# Patient Record
Sex: MALE | Race: BLACK OR AFRICAN AMERICAN | NOT HISPANIC OR LATINO | Employment: FULL TIME | URBAN - METROPOLITAN AREA
[De-identification: names, ages, dates, MRNs, and addresses within clinical notes are randomized per-mention and may not be internally consistent; named-entity substitution may affect disease eponyms.]

---

## 2018-09-22 ENCOUNTER — HOSPITAL ENCOUNTER (EMERGENCY)
Facility: CLINIC | Age: 33
Discharge: OTHER INSTITUTION | End: 2018-09-22
Attending: EMERGENCY MEDICINE | Admitting: EMERGENCY MEDICINE

## 2018-09-22 VITALS
TEMPERATURE: 98.2 F | DIASTOLIC BLOOD PRESSURE: 81 MMHG | WEIGHT: 160 LBS | RESPIRATION RATE: 16 BRPM | BODY MASS INDEX: 22.9 KG/M2 | OXYGEN SATURATION: 95 % | HEIGHT: 70 IN | SYSTOLIC BLOOD PRESSURE: 143 MMHG

## 2018-09-22 DIAGNOSIS — F10.920 ALCOHOLIC INTOXICATION WITHOUT COMPLICATION (H): ICD-10-CM

## 2018-09-22 PROCEDURE — 82075 ASSAY OF BREATH ETHANOL: CPT

## 2018-09-22 PROCEDURE — 99285 EMERGENCY DEPT VISIT HI MDM: CPT

## 2018-09-22 ASSESSMENT — ENCOUNTER SYMPTOMS
BACK PAIN: 0
TREMORS: 0
ABDOMINAL PAIN: 0

## 2018-09-22 NOTE — ED NOTES
Bed: ED17  Expected date:   Expected time:   Means of arrival:   Comments:  HEMS 411  32M  ETOH/dyspnea

## 2018-09-22 NOTE — ED PROVIDER NOTES
"  History     Chief Complaint:  Alcohol Intoxication    The history is provided by the patient. The history is limited by the condition of the patient.      Sidney Elliott is a 32 year old male with a history of alcohol abuse who presents to the emergency department via EMS for evaluation of alcohol intoxication. Earlier this [place], [place] \"[place]ouncing around places to stay.\" The patient was transferred to the emergency department via EMS as he was noticeably intoxicated and unable to care for himself. Here, the patient notes that he drinks \"a lot\" daily, but did not specify how much. He does endorse low back pain, but says that this is chronic and denies any other pain prompting his visit to the emergency department. The patient denies any suicidal ideation or history of alcohol withdrawal seizures but has had alcohol withdrawal symptoms in the past. Additionally, he notes the frequent use of marijuana.     Allergies:  NKDA    Medications:    Motrin    Past Medical History:    Asthma  Alcohol Abuse    Past Surgical History:    Orthopedic Surgery    Family History:    No past pertinent family history.    Social History:  Marital Status: Unknown  Tobacco Use: Yes, Daily  Alcohol Use: Yes, \"a lot\"    Review of Systems   Reason unable to perform ROS: Alcohol Intoxication.   Gastrointestinal: Negative for abdominal pain.   Musculoskeletal: Negative for back pain (Chronic).   Neurological: Negative for tremors.   Psychiatric/Behavioral: Negative for self-injury and suicidal ideas.       Physical Exam     Patient Vitals for the past 24 hrs:   BP Temp Temp src Heart Rate Resp SpO2 Height Weight   09/22/18 0422 126/89 98.2  F (36.8  C) Oral 65 16 99 % 1.778 m (5' 10\") 72.6 kg (160 lb)       Physical Exam  General: Lying on bed, smells of marijuana  Eyes:  The pupils are equal and round    Conjunctivae and sclerae are normal  ENT:    No head " trauma  Neck:  Normal range of motion  CV:  Regular rate and rhythm    Skin warm and well perfused   Resp:  Lungs are clear    Non-labored    No rales    No wheezing   GI:  Abdomen is soft, there is no rigidity    No distension    No rebound tenderness     No abdominal tenderness  MS:  Normal muscular tone  Skin:  No rash or acute skin lesions noted  Neuro:   Awake, alert.      Speech is slightly slurred    Face is symmetric.     Moves all extremities equally  Psych:  Normal affect.  Appropriate interactions.    Emergency Department Course     Emergency Department Course:  0427 Nursing notes and vitals reviewed. I performed an exam of the patient as documented above.     0638 I rechecked the patient, he still has unsteady gait.     The patient will be signed out to the oncoming physician, Dr. Pryor. The plan will be for detox when he is steady on his feet vs discharge once clinically sober.    Impression & Plan      Medical Decision Making:  Sidney Elliott is a 32 year old male who presents for evaluation of alcohol intoxication.  They are intoxicated here in ED by clinical assessment.   Patient has no history of alcohol withdrawal seizures. There are no signs of co-ingestion except marijuana including acetaminophen, drugs, medications, volatile alcohols. There are no signs of trauma related to alcohol use and no further workup is needed including head CT.   Patient too unsteady for detox right now. Plan for detox once steady on feet versus discharge once clinically sober.    Patient re-evaluated and still unsteady on feet. Denies SI - no indication for DEC assesment    Diagnosis:    ICD-10-CM    1. Alcoholic intoxication without complication (H) F10.920        Disposition:  Signed out to Dr. Pryor     Scribe Disclosure:  I, Toby Trent, am serving as a scribe on 9/22/2018 at 4:27 AM to personally document services performed by Tabby Beyer MD based on my observations and the provider's statements  to me.     Toby Trent  9/22/2018    EMERGENCY DEPARTMENT       Tabby Beyer MD  09/22/18 0656

## 2018-09-22 NOTE — DISCHARGE INSTRUCTIONS

## 2018-09-22 NOTE — ED AVS SNAPSHOT
Emergency Department    [address] [facility] [address]    Phone:  [phone]    Fax:  [phone]                                       Sindey Elliott   MRN: [id number]    Department:   Emergency Department   Date of Visit:  9/22/2018           Patient Information     Date Of Birth          1985        Your diagnoses for this visit were:     Alcoholic intoxication without complication (H)        You were seen by Tabby Beyer MD.      Follow-up Information     Follow up with  Emergency Department.    Specialty:  EMERGENCY MEDICINE    Why:  If symptoms worsen    Contact information:    [address] [facility] [id number] [phone]        Discharge Instructions       Discharge Instructions  Alcohol Intoxication    You have been seen today with alcohol intoxication. This means that you have enough alcohol in your system to impair your ability to mentally and physically function, perhaps to the extent that you were unable to care for yourself.    Generally, every Emergency Department visit should have a follow-up clinic visit with either a primary or a specialty clinic/provider. Please follow-up as instructed by your emergency provider today.    You may have come to the Emergency Department because of your intoxication, or for another reason, such as because of an injury. No matter what the case is, this visit is a  red flag  regarding alcohol use, and you should consider whether your drinking pattern is a problem for you.     You may be at risk for alcohol-related problems if:      Men: you drink more than 14 drinks per week, or more than 4 drinks per occasion.      Women: you drink more than 7 drinks per week or more than 3 drinks per occasion.      You have black-outs.    You do things you regret while drinking.    You have legal problems because of drinking.    You have job problems because of drinking (you call in sick to work because of  drinking).    CAGE Questions    Have you ever felt you should cut down on your drinking?    Have people annoyed you by criticizing your drinking?    Have you ever felt bad or guilty about your drinking?    Have you ever had a drink first thing in the morning to steady your nerves or get rid of a hangover (eye opener)?    If you answer yes to any of the CAGE questions, you may have a problem with alcohol.      Return to the Emergency Department if:    You become shaky or tremble when you try to stop drinking.     You have severe abdominal pain (belly pain).     You have a seizure or pass out.      You vomit (throw up) blood or have blood in your stool. This may be bright red or it may look like black coffee grounds.    You become lightheaded or faint.      For further help, contact:     Your caregiver.      Alcoholics Anonymous (AA).    o [facility] Intergroup: [phone]  o [facility] Central Office: [phone]     A drug or alcohol rehabilitation program.      You can get information on alcohol resources and groups by calling the number 545 or [phone] on any phone.     Seek medical care if:    You have persistent vomiting.     You have persistent pain in any part of your body.      You do not feel better after a few days.    If you were given a prescription for medicine here today, be sure to read all of the information (including the package insert) that comes with your prescription.  This will include important information about the medicine, its side effects, and any warnings that you need to know about.  The pharmacist who fills the prescription can provide more information and answer questions you may have about the medicine.  If you have questions or concerns that the pharmacist cannot address, please call or return to the Emergency Department.   Remember that you can always come back to the Emergency Department if you are not able to see your regular doctor in the amount  of time listed above, if you get any new symptoms, or if there is anything that worries you.      24 Hour Appointment Hotline       To make an appointment at any [facility], call [phone] ([phone]). If you don't have a family doctor or clinic, we will help you find one. [facility] clinics are conveniently located to serve the needs of you and your family.             Review of your medicines      Our records show that you are taking the medicines listed below. If these are incorrect, please call your family doctor or clinic.        Dose / Directions Last dose taken    MOTRIN PO        Refills:  0                Orders Needing Specimen Collection     None      Pending Results     No orders found from 9/20/2018 to 9/23/2018.            Pending Culture Results     No orders found from 9/20/2018 to 9/23/2018.            Pending Results Instructions     If you had any lab results that were not finalized at the time of your Discharge, you can call the ED Lab Result RN at [phone]. You will be contacted by this team for any positive Lab results or changes in treatment. The nurses are available 7 days a week from 10A to 6:30P.  You can leave a message 24 hours per day and they will return your call.        Test Results From Your Hospital Stay               Clinical Quality Measure: Blood Pressure Screening     Your blood pressure was checked while you were in the emergency department today. The last reading we obtained was  BP: 120/79 (Simultaneous filing. User may not have seen previous data.) . Please read the guidelines below about what these numbers mean and what you should do about them.  If your systolic blood pressure (the top number) is less than 120 and your diastolic blood pressure (the bottom number) is less than 80, then your blood pressure is normal. There is nothing more that you need to do about it.  If your systolic blood pressure (the top number) is 120-139 or your diastolic blood  "pressure (the bottom number) is 80-89, your blood pressure may be higher than it should be. You should have your blood pressure rechecked within a year by a primary care provider.  If your systolic blood pressure (the top number) is 140 or greater or your diastolic blood pressure (the bottom number) is 90 or greater, you may have high blood pressure. High blood pressure is treatable, but if left untreated over time it can put you at risk for heart attack, stroke, or kidney failure. You should have your blood pressure rechecked by a primary care provider within the next 4 weeks.  If your provider in the emergency department today gave you specific instructions to follow-up with your doctor or provider even sooner than that, you should follow that instruction and not wait for up to 4 weeks for your follow-up visit.        Thank you for choosing [facility]       Thank you for choosing [facility] for your care. Our goal is always to provide you with excellent care. Hearing back from our patients is one way we can continue to improve our services. Please take a few minutes to complete the written survey that you may receive in the mail after you visit with us. Thank you!        [employer] Information     [employer] lets you send messages to your doctor, view your test results, renew your prescriptions, schedule appointments and more. To sign up, go to [email or web address] . Click on \"Log in\" on the left side of the screen, which will take you to the Welcome page. Then click on \"Sign up Now\" on the right side of the page.     You will be asked to enter the access code listed below, as well as some personal information. Please follow the directions to create your username and password.     Your access code is: [email or web address]  Expires: 2018  9:47 AM     Your access code will  in 90 days. If you need help or a new code, please call your [facility] clinic or [phone].        Care EveryWhere ID     This is your Care " EveryWhere ID. This could be used by other organizations to access your [facility] medical records  [id number]        Equal Access to Services     DIOMEDES RIDLEY : Shama Shaffer, jin randall, kip zuluaga, katie cook. [facility] [phone].    ATENCIÓN: Si habla español, tiene a ayala disposición servicios gratuitos de asistencia lingüística. Llame al [phone].    We comply with applicable federal civil rights laws and [place] laws. We do not discriminate on the basis of race, color, national origin, age, disability, sex, sexual orientation, or gender identity.            After Visit Summary       This is your record. Keep this with you and show to your community pharmacist(s) and doctor(s) at your next visit.

## 2018-09-22 NOTE — ED AVS SNAPSHOT
Emergency Department    [address] [place] [address]    Phone:  [phone]    Fax:  [phone]                                       Sidney Elliott   MRN: [id number]    Department:   Emergency Department   Date of Visit:  9/22/2018           After Visit Summary Signature Page     I have received my discharge instructions, and my questions have been answered. I have discussed any challenges I see with this plan with the nurse or doctor.    ..........................................................................................................................................  Patient/Patient Representative Signature      ..........................................................................................................................................  Patient Representative Print Name and Relationship to Patient    ..................................................               ................................................  Date                                   Time    ..........................................................................................................................................  Reviewed by Signature/Title    ...................................................              ..............................................  Date                                               Time          22EPIC Rev 08/18